# Patient Record
Sex: MALE | URBAN - NONMETROPOLITAN AREA
[De-identification: names, ages, dates, MRNs, and addresses within clinical notes are randomized per-mention and may not be internally consistent; named-entity substitution may affect disease eponyms.]

---

## 2023-09-26 ENCOUNTER — APPOINTMENT (RX ONLY)
Dept: URBAN - NONMETROPOLITAN AREA CLINIC 32 | Facility: CLINIC | Age: 33
Setting detail: DERMATOLOGY
End: 2023-09-26

## 2023-09-26 DIAGNOSIS — L71.0 PERIORAL DERMATITIS: ICD-10-CM | Status: INADEQUATELY CONTROLLED

## 2023-09-26 PROCEDURE — ? COUNSELING

## 2023-09-26 PROCEDURE — 99204 OFFICE O/P NEW MOD 45 MIN: CPT

## 2023-09-26 PROCEDURE — ? PRESCRIPTION

## 2023-09-26 RX ORDER — TACROLIMUS 1 MG/G
OINTMENT TOPICAL
Qty: 60 | Refills: 1 | Status: ERX | COMMUNITY
Start: 2023-09-26

## 2023-09-26 RX ORDER — DOXYCYCLINE HYCLATE 100 MG/1
CAPSULE, GELATIN COATED ORAL
Qty: 60 | Refills: 2 | Status: ERX | COMMUNITY
Start: 2023-09-26

## 2023-09-26 RX ADMIN — TACROLIMUS: 1 OINTMENT TOPICAL at 00:00

## 2023-09-26 RX ADMIN — DOXYCYCLINE HYCLATE: 100 CAPSULE, GELATIN COATED ORAL at 00:00

## 2023-09-26 ASSESSMENT — LOCATION DETAILED DESCRIPTION DERM
LOCATION DETAILED: RIGHT MEDIAL MALAR CHEEK
LOCATION DETAILED: RIGHT UPPER CUTANEOUS LIP
LOCATION DETAILED: LEFT MEDIAL MALAR CHEEK
LOCATION DETAILED: LEFT UPPER CUTANEOUS LIP
LOCATION DETAILED: RIGHT SUPERIOR MEDIAL MALAR CHEEK
LOCATION DETAILED: LEFT SUPERIOR MEDIAL MALAR CHEEK

## 2023-09-26 ASSESSMENT — LOCATION ZONE DERM
LOCATION ZONE: LIP
LOCATION ZONE: FACE

## 2023-09-26 ASSESSMENT — LOCATION SIMPLE DESCRIPTION DERM
LOCATION SIMPLE: LEFT CHEEK
LOCATION SIMPLE: RIGHT LIP
LOCATION SIMPLE: RIGHT CHEEK
LOCATION SIMPLE: LEFT LIP

## 2023-09-26 NOTE — HPI: RASH
Is This A New Presentation, Or A Follow-Up?: Rash
Additional History: Pt tried Betamethasone BID for a month. Rash got worse then flared when he stopped. Lotriman BID for 2wks no improvement. Mupirocin BID with Doxycycline BID for 10 days. Almost resolved then flared. He is still applying Mupirocin BID 4wks.

## 2023-09-26 NOTE — PROCEDURE: COUNSELING
Detail Level: Detailed
Patient Specific Counseling (Will Not Stick From Patient To Patient): FAVORS POD ALONE, NO SIGNS OF CONCOMITANT SEB DERM TODAY, WILL MOVE FORWARD WITH PO DOXY AND TOPICAL TACROLIMUS AS DIRECTED, RX PA OVERRIDDEN AS PT WANTS TO GET STARTED ASAP, CONSIDER [facility] CARD VS [facility]IN [facility] IF [facility]CIAL PH[facility] RX MARKEDLY EXPENSIVE WITH OVERRIDE, STOP ALL OTHER PREVIOUS TXS AND LIMIT SKIN CARE REGIMEN OF AA TO GENTLE CLEANSER AND MOISTURIZING WITH VASELINE, F/U 4-6 WKS

## 2023-09-27 ENCOUNTER — RX ONLY (OUTPATIENT)
Age: 33
Setting detail: RX ONLY
End: 2023-09-27

## 2023-09-27 RX ORDER — TACROLIMUS 1 MG/G
OINTMENT TOPICAL
Qty: 60 | Refills: 1 | Status: ACTIVE

## 2023-09-27 RX ORDER — TACROLIMUS 1 MG/G
OINTMENT TOPICAL
Qty: 60 | Refills: 1 | Status: ERX | COMMUNITY
Start: 2023-09-27